# Patient Record
Sex: MALE | Race: BLACK OR AFRICAN AMERICAN | NOT HISPANIC OR LATINO | URBAN - METROPOLITAN AREA
[De-identification: names, ages, dates, MRNs, and addresses within clinical notes are randomized per-mention and may not be internally consistent; named-entity substitution may affect disease eponyms.]

---

## 2022-01-28 ENCOUNTER — EMERGENCY (EMERGENCY)
Facility: HOSPITAL | Age: 42
LOS: 1 days | Discharge: HOME | End: 2022-01-28
Attending: EMERGENCY MEDICINE | Admitting: EMERGENCY MEDICINE
Payer: MEDICAID

## 2022-01-28 VITALS
TEMPERATURE: 98 F | DIASTOLIC BLOOD PRESSURE: 95 MMHG | RESPIRATION RATE: 19 BRPM | SYSTOLIC BLOOD PRESSURE: 148 MMHG | OXYGEN SATURATION: 98 % | HEIGHT: 70 IN | WEIGHT: 160.06 LBS | HEART RATE: 106 BPM

## 2022-01-28 VITALS
SYSTOLIC BLOOD PRESSURE: 138 MMHG | HEART RATE: 88 BPM | OXYGEN SATURATION: 98 % | DIASTOLIC BLOOD PRESSURE: 87 MMHG | RESPIRATION RATE: 17 BRPM | TEMPERATURE: 98 F

## 2022-01-28 DIAGNOSIS — R42 DIZZINESS AND GIDDINESS: ICD-10-CM

## 2022-01-28 DIAGNOSIS — R20.2 PARESTHESIA OF SKIN: ICD-10-CM

## 2022-01-28 DIAGNOSIS — Z20.822 CONTACT WITH AND (SUSPECTED) EXPOSURE TO COVID-19: ICD-10-CM

## 2022-01-28 LAB
ALBUMIN SERPL ELPH-MCNC: 4.3 G/DL — SIGNIFICANT CHANGE UP (ref 3.3–5)
ALP SERPL-CCNC: 62 U/L — SIGNIFICANT CHANGE UP (ref 40–120)
ALT FLD-CCNC: 29 U/L — SIGNIFICANT CHANGE UP (ref 10–45)
ANION GAP SERPL CALC-SCNC: 10 MMOL/L — SIGNIFICANT CHANGE UP (ref 5–17)
AST SERPL-CCNC: 31 U/L — SIGNIFICANT CHANGE UP (ref 10–40)
BASOPHILS # BLD AUTO: 0.04 K/UL — SIGNIFICANT CHANGE UP (ref 0–0.2)
BASOPHILS NFR BLD AUTO: 0.6 % — SIGNIFICANT CHANGE UP (ref 0–2)
BILIRUB SERPL-MCNC: 0.3 MG/DL — SIGNIFICANT CHANGE UP (ref 0.2–1.2)
BUN SERPL-MCNC: 19 MG/DL — SIGNIFICANT CHANGE UP (ref 7–23)
CALCIUM SERPL-MCNC: 8.9 MG/DL — SIGNIFICANT CHANGE UP (ref 8.4–10.5)
CHLORIDE SERPL-SCNC: 98 MMOL/L — SIGNIFICANT CHANGE UP (ref 96–108)
CO2 SERPL-SCNC: 27 MMOL/L — SIGNIFICANT CHANGE UP (ref 22–31)
CREAT SERPL-MCNC: 1.06 MG/DL — SIGNIFICANT CHANGE UP (ref 0.5–1.3)
EOSINOPHIL # BLD AUTO: 0.11 K/UL — SIGNIFICANT CHANGE UP (ref 0–0.5)
EOSINOPHIL NFR BLD AUTO: 1.7 % — SIGNIFICANT CHANGE UP (ref 0–6)
GLUCOSE SERPL-MCNC: 78 MG/DL — SIGNIFICANT CHANGE UP (ref 70–99)
HCT VFR BLD CALC: 46.7 % — SIGNIFICANT CHANGE UP (ref 39–50)
HGB BLD-MCNC: 14.4 G/DL — SIGNIFICANT CHANGE UP (ref 13–17)
IMM GRANULOCYTES NFR BLD AUTO: 0.3 % — SIGNIFICANT CHANGE UP (ref 0–1.5)
LYMPHOCYTES # BLD AUTO: 3.98 K/UL — HIGH (ref 1–3.3)
LYMPHOCYTES # BLD AUTO: 62.7 % — HIGH (ref 13–44)
MAGNESIUM SERPL-MCNC: 2 MG/DL — SIGNIFICANT CHANGE UP (ref 1.6–2.6)
MCHC RBC-ENTMCNC: 28.2 PG — SIGNIFICANT CHANGE UP (ref 27–34)
MCHC RBC-ENTMCNC: 30.8 GM/DL — LOW (ref 32–36)
MCV RBC AUTO: 91.6 FL — SIGNIFICANT CHANGE UP (ref 80–100)
MONOCYTES # BLD AUTO: 0.6 K/UL — SIGNIFICANT CHANGE UP (ref 0–0.9)
MONOCYTES NFR BLD AUTO: 9.4 % — SIGNIFICANT CHANGE UP (ref 2–14)
NEUTROPHILS # BLD AUTO: 1.6 K/UL — LOW (ref 1.8–7.4)
NEUTROPHILS NFR BLD AUTO: 25.3 % — LOW (ref 43–77)
NRBC # BLD: 0 /100 WBCS — SIGNIFICANT CHANGE UP (ref 0–0)
PLATELET # BLD AUTO: 213 K/UL — SIGNIFICANT CHANGE UP (ref 150–400)
POTASSIUM SERPL-MCNC: 4 MMOL/L — SIGNIFICANT CHANGE UP (ref 3.5–5.3)
POTASSIUM SERPL-SCNC: 4 MMOL/L — SIGNIFICANT CHANGE UP (ref 3.5–5.3)
PROT SERPL-MCNC: 10.8 G/DL — HIGH (ref 6–8.3)
RBC # BLD: 5.1 M/UL — SIGNIFICANT CHANGE UP (ref 4.2–5.8)
RBC # FLD: 14.6 % — HIGH (ref 10.3–14.5)
SARS-COV-2 RNA SPEC QL NAA+PROBE: NEGATIVE — SIGNIFICANT CHANGE UP
SODIUM SERPL-SCNC: 135 MMOL/L — SIGNIFICANT CHANGE UP (ref 135–145)
TSH SERPL-MCNC: 1.98 UIU/ML — SIGNIFICANT CHANGE UP (ref 0.27–4.2)
WBC # BLD: 6.35 K/UL — SIGNIFICANT CHANGE UP (ref 3.8–10.5)
WBC # FLD AUTO: 6.35 K/UL — SIGNIFICANT CHANGE UP (ref 3.8–10.5)

## 2022-01-28 PROCEDURE — 84443 ASSAY THYROID STIM HORMONE: CPT

## 2022-01-28 PROCEDURE — 99284 EMERGENCY DEPT VISIT MOD MDM: CPT

## 2022-01-28 PROCEDURE — 93010 ELECTROCARDIOGRAM REPORT: CPT

## 2022-01-28 PROCEDURE — 87635 SARS-COV-2 COVID-19 AMP PRB: CPT

## 2022-01-28 PROCEDURE — 85025 COMPLETE CBC W/AUTO DIFF WBC: CPT

## 2022-01-28 PROCEDURE — 36415 COLL VENOUS BLD VENIPUNCTURE: CPT

## 2022-01-28 PROCEDURE — 93005 ELECTROCARDIOGRAM TRACING: CPT

## 2022-01-28 PROCEDURE — 83735 ASSAY OF MAGNESIUM: CPT

## 2022-01-28 PROCEDURE — 80053 COMPREHEN METABOLIC PANEL: CPT

## 2022-01-28 NOTE — ED PROVIDER NOTE - NSFOLLOWUPINSTRUCTIONS_ED_ALL_ED_FT
follow up with primary care provider/RDC after the weekend - call today to schedule.  Return to the Emergency Department if you have any new or worsening symptoms, or if you have any concerns.  ======================    Paresthesia    WHAT YOU NEED TO KNOW:    Paresthesia is numbness, tingling, or burning. It can happen in any part of your body, but usually occurs in your legs, feet, arms, or hands.    DISCHARGE INSTRUCTIONS:    Return to the emergency department if:   •You have severe pain along with numbness and tingling.      •Your legs suddenly become cold. You have trouble moving your legs, and they ache.      •You have increased weakness in a part of your body.      •You have uncontrolled movements.      Contact your healthcare provider or neurologist if:   •Your symptoms do not improve.      •You have symptoms in more than one part of your body.      •You have questions or concerns about your condition or care.      Manage paresthesia:   •Protect the area from injury. You may injure or burn yourself if you lose feeling in the area. Be careful when you touch anything that could be hot. Wear sturdy shoes to protect your feet. Ask about other ways to protect yourself.       •Go to physical or occupational therapy if directed. Your provider may recommend therapy if you have a condition such as carpal tunnel syndrome. A physical therapist can teach you exercises to help strengthen the area or increase your ability to move it. An occupational therapist can help you find new ways to do your daily activities.      •Manage health conditions that can cause paresthesia. Work with your diabetes specialist if you have uncontrolled diabetes. A dietitian or your healthcare provider can help you create a meal plan if you have low vitamin B levels. Your provider can help you manage your health if you have multiple sclerosis or you had a stroke. It is important to manage health conditions to stop paresthesia or prevent it from getting worse.      Follow up with your healthcare provider or neurologist as directed: Your healthcare provider may refer you to a specialist. Write down your questions so you remember to ask them during your visits.

## 2022-01-28 NOTE — ED PROVIDER NOTE - NSFOLLOWUPCLINICS_GEN_ALL_ED_FT
[facility]  HIV/AIDS Research & Treatment  [address] [place] [address]  Phone: [phone]  Fax:

## 2022-01-28 NOTE — ED PROVIDER NOTE - ATTENDING CONTRIBUTION TO CARE
41M hx HIV, c/o lightheadedness. pt states ongoing intermittently over past 2-3 weeks. states tonight had some tingling to hands and feet, with jaw clenching. no chest pain, no SOB. no n/v. pt states not currently taking his HIV meds.  no fevers.   gen- nad  heent- ncat, clear conj  cv -rrr  lungs -ctab  abd - soft, nt, nd  ext -wwp, no edema  neuro -aox3, steady gait, smith  no acute st/t wave changes on EKG, labs checked, no focal neuro deficits on exam. doubt acs.  recommend f/u in HIV clinic.

## 2022-01-28 NOTE — ED PROVIDER NOTE - NS ED ROS FT
CONSTITUTIONAL: No fever, chills, or weakness; no weight loss  NEURO: No headache, no dizziness, no syncope  EYES: No visual changes  ENT: No rhinorrhea or sore throat  PULM: No cough or dyspnea  CV: No chest pain or palpitations  GI: No abdominal pain, vomiting, or diarrhea  : No dysuria, hematuria, frequency  MSK: No neck pain or back pain, no joint pain  SKIN: no rash or unusual bruising

## 2022-01-28 NOTE — ED PROVIDER NOTE - CLINICAL SUMMARY MEDICAL DECISION MAKING FREE TEXT BOX
Brief episode paresthesia of hands and feet with tightening/clenching of jaw.  Asymptomatic in ED.  Suspicious for panic attack/hyperventilation syndrome.  Normal neuro exam. Normal EKG. Will check labs to eval for electrolyte abnormalities.

## 2022-01-28 NOTE — ED PROVIDER NOTE - PATIENT PORTAL LINK FT
You can access the FollowMyHealth Patient Portal offered by [facility] by registering at the following website: [email or web address]. By joining [employer]’s FollowMyHealth portal, you will also be able to view your health information using other applications (apps) compatible with our system.

## 2022-01-28 NOTE — ED PROVIDER NOTE - PHYSICAL EXAMINATION
CONSTITUTIONAL: NAD   SKIN: Normal color and turgor.    HEAD: NC/AT.  EYES: Conjunctiva clear. EOMI. PERRL.    ENT: Airway clear. Normal voice.  Goiter noted.  RESPIRATORY:  Normal work of breathing. Lungs CTAB.  CARDIOVASCULAR:  RRR, S1S2. No M/R/G.      GI:  Abdomen soft, nontender.    MSK: Neck supple.  No edema.  No muscular tenderness. No joint swelling or ROM limitation.  NEURO: Alert; CN: grossly intact. Speech clear.  MOSS. Ambulating well. CONSTITUTIONAL: WD/WN man in NAD; pleasant, calm and cooperative  SKIN: Normal color and turgor.    HEAD: NC/AT.  EYES: Conjunctiva clear. EOMI. PERRL.    ENT: Airway clear. Normal voice.  Goiter noted.  RESPIRATORY:  Normal work of breathing. Lungs CTAB.  CARDIOVASCULAR:  RRR, S1S2. No M/R/G.      GI:  Abdomen soft, nontender.    MSK: Neck supple.  No edema.  No muscular tenderness. No joint swelling or ROM limitation.  NEURO: Alert; CN: grossly intact. Speech clear.  MOSS. Ambulating well.

## 2022-01-28 NOTE — ED PROVIDER NOTE - INTERPRETATION
sinus arrhythmia/normal sinus rhythm, Normal axis, Normal SD interval and QRS complex. There are no acute ischemic ST or T-wave changes.

## 2022-01-28 NOTE — ED PROVIDER NOTE - OBJECTIVE STATEMENT
40 yo m PMHX HIV, for the past 2-3 weeks having episodes lightheadedness multiple times per day, lasting few seconds at a time.   Tonight while driving he says he experienced an episode of tingling in both hands and feet, along with clenching of his jaw.  The episode lasted about a minute, he was driving at the time and had to pull over.  No syncopal episodes, no CP or palpitations.  No recent illness, no fever/chills, no weight loss. He says he has not taken his HIV meds (Genvoya) since last summer because he has been distracted by the death of his mother last May.  He feels guilty about it because he had a bad feeling when he hugged her the day she .  He denies SI/HI and hallucinations/voices, has no psych hx. 42 yo m PMHX HIV, for the past 2-3 weeks having episodes lightheadedness multiple times per day, lasting few seconds at a time.   Tonight while driving he says he experienced an episode of tingling in both hands and feet, along with clenching of his jaw.  The episode lasted about a minute, he was driving at the time and had to pull over.  No syncopal episodes, no CP or palpitations.  No recent illness, no fever/chills, no weight loss. He says he has not taken his HIV meds (Genvoya) since last summer because he has been distracted by the death of his mother last May.  He feels guilty about it because he had a bad feeling when he hugged her the day she .  He denies SI/HI and hallucinations/voices, has no psych hx. Used crystal meth once in November, but denies any other illicit drug use.  No psych meds.  Only prescribed med Genvoya; he does not recall his CD4 or VL. 42 yo m PMHX HIV, for the past 2-3 weeks having episodes lightheadedness multiple times per day, lasting few seconds at a time.   Tonight while driving he says he experienced an episode of tingling in both hands and feet, along with clenching of his jaw.  The episode lasted about a minute, he was driving at the time and had to pull over.  No syncopal episodes, no CP or palpitations.  No recent illness, no fever/chills, no weight loss. He says he has not taken his HIV meds (Genvoya) since last summer because he has been distracted by the death of his mother last May.  He feels guilty about it because he had a bad feeling when he hugged her the day she .  He denies SI/HI and hallucinations/voices, has no psych hx. Used crystal meth once in November, but denies any other illicit drug use.  No psych meds.  Only prescribed med Genvoya; he does not recall his CD4 or VL.  Had Covid vaccine doses 1&2. 42 yo m PMHX HIV, for the past 2-3 weeks having episodes lightheadedness multiple times per day, lasting few seconds at a time.   Tonight while driving he says he experienced an episode of tingling in both hands and feet, along with clenching of his jaw.  The episode lasted about a minute, he was driving at the time and had to pull over.  He is asymptomatic in ED.  No hx of syncopal episodes, no CP or palpitations.  No recent illness, no fever/chills, no weight loss. He says he has not taken his HIV meds (Genvoya) since last summer because he has been distracted by the death of his mother last May.  He feels guilty about it because he had a bad feeling when he hugged her the day she .  He denies SI/HI and hallucinations/voices, has no psych hx. Used crystal meth once in November, but denies any other illicit drug use.  No psych meds.  Only prescribed med Genvoya; he does not recall his CD4 or VL.  Had Covid vaccine doses 1&2.

## 2022-01-28 NOTE — ED ADULT TRIAGE NOTE - ARRIVAL INFO ADDITIONAL COMMENTS
PMH of HIV - last CD4 count reported 7 months prior. Patient reports that he is supposed to be under medications - reports that he has been off for almost a year.

## 2022-01-28 NOTE — ED ADULT NURSE NOTE - CHIEF COMPLAINT QUOTE
"I have been having bouts of dizziness for about a week now. Before 6 last evening, I started feeling numbness and tingling to [BOTH] hands and [BOTH] feet and I felt that my jaw was clenching. I went to [facility] and I have been there since six - I did not want to wait any longer so I left and came here."

## 2022-01-28 NOTE — ED ADULT TRIAGE NOTE - CHIEF COMPLAINT QUOTE
"I have been having bouts of dizziness for about a week now. Before 6 last evening, I started feeling numbness and tingling to [BOTH] hands and [BOTH] feet and I felt that my jaw was clenching. I went to [facility] and I have been there since six - I did not want to wait any longer so I left and came here."

## 2022-08-08 ENCOUNTER — EMERGENCY (EMERGENCY)
Facility: HOSPITAL | Age: 42
LOS: 1 days | Discharge: HOME | End: 2022-08-08
Attending: STUDENT IN AN ORGANIZED HEALTH CARE EDUCATION/TRAINING PROGRAM | Admitting: STUDENT IN AN ORGANIZED HEALTH CARE EDUCATION/TRAINING PROGRAM
Payer: MEDICAID

## 2022-08-08 VITALS
DIASTOLIC BLOOD PRESSURE: 76 MMHG | SYSTOLIC BLOOD PRESSURE: 120 MMHG | OXYGEN SATURATION: 99 % | HEART RATE: 94 BPM | TEMPERATURE: 98 F | RESPIRATION RATE: 20 BRPM

## 2022-08-08 VITALS
TEMPERATURE: 102 F | RESPIRATION RATE: 18 BRPM | DIASTOLIC BLOOD PRESSURE: 76 MMHG | SYSTOLIC BLOOD PRESSURE: 128 MMHG | WEIGHT: 164.02 LBS | HEIGHT: 70 IN | HEART RATE: 121 BPM | OXYGEN SATURATION: 100 %

## 2022-08-08 DIAGNOSIS — Z20.822 CONTACT WITH AND (SUSPECTED) EXPOSURE TO COVID-19: ICD-10-CM

## 2022-08-08 DIAGNOSIS — N39.0 URINARY TRACT INFECTION, SITE NOT SPECIFIED: ICD-10-CM

## 2022-08-08 DIAGNOSIS — B20 HUMAN IMMUNODEFICIENCY VIRUS [HIV] DISEASE: ICD-10-CM

## 2022-08-08 DIAGNOSIS — N50.89 OTHER SPECIFIED DISORDERS OF THE MALE GENITAL ORGANS: ICD-10-CM

## 2022-08-08 DIAGNOSIS — D72.829 ELEVATED WHITE BLOOD CELL COUNT, UNSPECIFIED: ICD-10-CM

## 2022-08-08 DIAGNOSIS — N45.3 EPIDIDYMO-ORCHITIS: ICD-10-CM

## 2022-08-08 LAB
ALBUMIN SERPL ELPH-MCNC: 4 G/DL — SIGNIFICANT CHANGE UP (ref 3.3–5)
ALP SERPL-CCNC: 59 U/L — SIGNIFICANT CHANGE UP (ref 40–120)
ALT FLD-CCNC: 29 U/L — SIGNIFICANT CHANGE UP (ref 10–45)
ANION GAP SERPL CALC-SCNC: 9 MMOL/L — SIGNIFICANT CHANGE UP (ref 5–17)
APPEARANCE UR: CLEAR — SIGNIFICANT CHANGE UP
APTT BLD: 28.4 SEC — SIGNIFICANT CHANGE UP (ref 27.5–35.5)
AST SERPL-CCNC: 39 U/L — SIGNIFICANT CHANGE UP (ref 10–40)
BACTERIA # UR AUTO: PRESENT /HPF
BASOPHILS # BLD AUTO: 0.02 K/UL — SIGNIFICANT CHANGE UP (ref 0–0.2)
BASOPHILS NFR BLD AUTO: 0.1 % — SIGNIFICANT CHANGE UP (ref 0–2)
BILIRUB SERPL-MCNC: 0.7 MG/DL — SIGNIFICANT CHANGE UP (ref 0.2–1.2)
BILIRUB UR-MCNC: NEGATIVE — SIGNIFICANT CHANGE UP
BUN SERPL-MCNC: 14 MG/DL — SIGNIFICANT CHANGE UP (ref 7–23)
CALCIUM SERPL-MCNC: 9.3 MG/DL — SIGNIFICANT CHANGE UP (ref 8.4–10.5)
CHLORIDE SERPL-SCNC: 94 MMOL/L — LOW (ref 96–108)
CO2 SERPL-SCNC: 27 MMOL/L — SIGNIFICANT CHANGE UP (ref 22–31)
COLOR SPEC: YELLOW — SIGNIFICANT CHANGE UP
COMMENT - URINE: SIGNIFICANT CHANGE UP
CREAT SERPL-MCNC: 1.2 MG/DL — SIGNIFICANT CHANGE UP (ref 0.5–1.3)
DIFF PNL FLD: ABNORMAL
EGFR: 78 ML/MIN/1.73M2 — SIGNIFICANT CHANGE UP
EOSINOPHIL # BLD AUTO: 0 K/UL — SIGNIFICANT CHANGE UP (ref 0–0.5)
EOSINOPHIL NFR BLD AUTO: 0 % — SIGNIFICANT CHANGE UP (ref 0–6)
EPI CELLS # UR: SIGNIFICANT CHANGE UP /HPF (ref 0–5)
GLUCOSE SERPL-MCNC: 105 MG/DL — HIGH (ref 70–99)
GLUCOSE UR QL: NEGATIVE — SIGNIFICANT CHANGE UP
HCT VFR BLD CALC: 41.9 % — SIGNIFICANT CHANGE UP (ref 39–50)
HGB BLD-MCNC: 13.6 G/DL — SIGNIFICANT CHANGE UP (ref 13–17)
HYALINE CASTS # UR AUTO: SIGNIFICANT CHANGE UP /LPF (ref 0–2)
IMM GRANULOCYTES NFR BLD AUTO: 0.5 % — SIGNIFICANT CHANGE UP (ref 0–1.5)
INR BLD: 1.32 — HIGH (ref 0.88–1.16)
KETONES UR-MCNC: NEGATIVE — SIGNIFICANT CHANGE UP
LACTATE SERPL-SCNC: 1.4 MMOL/L — SIGNIFICANT CHANGE UP (ref 0.5–2)
LEUKOCYTE ESTERASE UR-ACNC: ABNORMAL
LYMPHOCYTES # BLD AUTO: 24.1 % — SIGNIFICANT CHANGE UP (ref 13–44)
LYMPHOCYTES # BLD AUTO: 3.6 K/UL — HIGH (ref 1–3.3)
MCHC RBC-ENTMCNC: 29.3 PG — SIGNIFICANT CHANGE UP (ref 27–34)
MCHC RBC-ENTMCNC: 32.5 GM/DL — SIGNIFICANT CHANGE UP (ref 32–36)
MCV RBC AUTO: 90.3 FL — SIGNIFICANT CHANGE UP (ref 80–100)
MONOCYTES # BLD AUTO: 1.34 K/UL — HIGH (ref 0–0.9)
MONOCYTES NFR BLD AUTO: 9 % — SIGNIFICANT CHANGE UP (ref 2–14)
NEUTROPHILS # BLD AUTO: 9.93 K/UL — HIGH (ref 1.8–7.4)
NEUTROPHILS NFR BLD AUTO: 66.3 % — SIGNIFICANT CHANGE UP (ref 43–77)
NITRITE UR-MCNC: NEGATIVE — SIGNIFICANT CHANGE UP
NRBC # BLD: 0 /100 WBCS — SIGNIFICANT CHANGE UP (ref 0–0)
PH UR: 6.5 — SIGNIFICANT CHANGE UP (ref 5–8)
PLATELET # BLD AUTO: 172 K/UL — SIGNIFICANT CHANGE UP (ref 150–400)
POTASSIUM SERPL-MCNC: 4.8 MMOL/L — SIGNIFICANT CHANGE UP (ref 3.5–5.3)
POTASSIUM SERPL-SCNC: 4.8 MMOL/L — SIGNIFICANT CHANGE UP (ref 3.5–5.3)
PROT SERPL-MCNC: 10.6 G/DL — HIGH (ref 6–8.3)
PROT UR-MCNC: ABNORMAL MG/DL
PROTHROM AB SERPL-ACNC: 15.8 SEC — HIGH (ref 10.5–13.4)
RBC # BLD: 4.64 M/UL — SIGNIFICANT CHANGE UP (ref 4.2–5.8)
RBC # FLD: 14.1 % — SIGNIFICANT CHANGE UP (ref 10.3–14.5)
RBC CASTS # UR COMP ASSIST: < 5 /HPF — SIGNIFICANT CHANGE UP
SARS-COV-2 RNA SPEC QL NAA+PROBE: NEGATIVE — SIGNIFICANT CHANGE UP
SODIUM SERPL-SCNC: 130 MMOL/L — LOW (ref 135–145)
SP GR SPEC: 1.01 — SIGNIFICANT CHANGE UP (ref 1–1.03)
UROBILINOGEN FLD QL: 1 E.U./DL — SIGNIFICANT CHANGE UP
WBC # BLD: 14.96 K/UL — HIGH (ref 3.8–10.5)
WBC # FLD AUTO: 14.96 K/UL — HIGH (ref 3.8–10.5)
WBC UR QL: ABNORMAL /HPF

## 2022-08-08 PROCEDURE — 96375 TX/PRO/DX INJ NEW DRUG ADDON: CPT

## 2022-08-08 PROCEDURE — 86593 SYPHILIS TEST NON-TREP QUANT: CPT

## 2022-08-08 PROCEDURE — 99285 EMERGENCY DEPT VISIT HI MDM: CPT | Mod: 25

## 2022-08-08 PROCEDURE — 87040 BLOOD CULTURE FOR BACTERIA: CPT

## 2022-08-08 PROCEDURE — 76870 US EXAM SCROTUM: CPT

## 2022-08-08 PROCEDURE — 71045 X-RAY EXAM CHEST 1 VIEW: CPT

## 2022-08-08 PROCEDURE — 85730 THROMBOPLASTIN TIME PARTIAL: CPT

## 2022-08-08 PROCEDURE — 93010 ELECTROCARDIOGRAM REPORT: CPT | Mod: 59

## 2022-08-08 PROCEDURE — 80053 COMPREHEN METABOLIC PANEL: CPT

## 2022-08-08 PROCEDURE — 71045 X-RAY EXAM CHEST 1 VIEW: CPT | Mod: 26

## 2022-08-08 PROCEDURE — 86592 SYPHILIS TEST NON-TREP QUAL: CPT

## 2022-08-08 PROCEDURE — 87186 SC STD MICRODIL/AGAR DIL: CPT

## 2022-08-08 PROCEDURE — 87635 SARS-COV-2 COVID-19 AMP PRB: CPT

## 2022-08-08 PROCEDURE — 86780 TREPONEMA PALLIDUM: CPT

## 2022-08-08 PROCEDURE — 36415 COLL VENOUS BLD VENIPUNCTURE: CPT

## 2022-08-08 PROCEDURE — 93005 ELECTROCARDIOGRAM TRACING: CPT

## 2022-08-08 PROCEDURE — 99284 EMERGENCY DEPT VISIT MOD MDM: CPT | Mod: 25

## 2022-08-08 PROCEDURE — 87086 URINE CULTURE/COLONY COUNT: CPT

## 2022-08-08 PROCEDURE — 83605 ASSAY OF LACTIC ACID: CPT

## 2022-08-08 PROCEDURE — 81001 URINALYSIS AUTO W/SCOPE: CPT

## 2022-08-08 PROCEDURE — 96374 THER/PROPH/DIAG INJ IV PUSH: CPT

## 2022-08-08 PROCEDURE — 76870 US EXAM SCROTUM: CPT | Mod: 26

## 2022-08-08 PROCEDURE — 85025 COMPLETE CBC W/AUTO DIFF WBC: CPT

## 2022-08-08 PROCEDURE — 85610 PROTHROMBIN TIME: CPT

## 2022-08-08 PROCEDURE — 87491 CHLMYD TRACH DNA AMP PROBE: CPT

## 2022-08-08 PROCEDURE — 87591 N.GONORRHOEAE DNA AMP PROB: CPT

## 2022-08-08 RX ORDER — MORPHINE SULFATE 50 MG/1
4 CAPSULE, EXTENDED RELEASE ORAL ONCE
Refills: 0 | Status: DISCONTINUED | OUTPATIENT
Start: 2022-08-08 | End: 2022-08-08

## 2022-08-08 RX ORDER — ACETAMINOPHEN 500 MG
975 TABLET ORAL ONCE
Refills: 0 | Status: COMPLETED | OUTPATIENT
Start: 2022-08-08 | End: 2022-08-08

## 2022-08-08 RX ORDER — KETOROLAC TROMETHAMINE 30 MG/ML
15 SYRINGE (ML) INJECTION ONCE
Refills: 0 | Status: COMPLETED | OUTPATIENT
Start: 2022-08-08 | End: 2022-08-08

## 2022-08-08 RX ORDER — CEFTRIAXONE 500 MG/1
1000 INJECTION, POWDER, FOR SOLUTION INTRAMUSCULAR; INTRAVENOUS ONCE
Refills: 0 | Status: COMPLETED | OUTPATIENT
Start: 2022-08-08 | End: 2022-08-08

## 2022-08-08 RX ORDER — SODIUM CHLORIDE 9 MG/ML
2300 INJECTION, SOLUTION INTRAVENOUS ONCE
Refills: 0 | Status: COMPLETED | OUTPATIENT
Start: 2022-08-08 | End: 2022-08-08

## 2022-08-08 RX ADMIN — Medication 100 MILLIGRAM(S): at 17:12

## 2022-08-08 RX ADMIN — Medication 975 MILLIGRAM(S): at 15:00

## 2022-08-08 RX ADMIN — CEFTRIAXONE 100 MILLIGRAM(S): 500 INJECTION, POWDER, FOR SOLUTION INTRAMUSCULAR; INTRAVENOUS at 17:11

## 2022-08-08 RX ADMIN — MORPHINE SULFATE 4 MILLIGRAM(S): 50 CAPSULE, EXTENDED RELEASE ORAL at 18:43

## 2022-08-08 RX ADMIN — Medication 975 MILLIGRAM(S): at 18:43

## 2022-08-08 RX ADMIN — SODIUM CHLORIDE 2300 MILLILITER(S): 9 INJECTION, SOLUTION INTRAVENOUS at 14:45

## 2022-08-08 RX ADMIN — MORPHINE SULFATE 4 MILLIGRAM(S): 50 CAPSULE, EXTENDED RELEASE ORAL at 17:12

## 2022-08-08 NOTE — CONSULT NOTE ADULT - ASSESSMENT
Patient is a  42M w/ epididymitis on Ultrasound  Patient is a  42M w/ epididymitis on Ultrasound, no torsion.  Patient is comfortable,  and improving.  At moment patient requires no acute  surgical intervention.  Patient educated to return to ed if development of worsening symptoms, intractable pain, w/ intractable nausea and vomiting, or if unable to urinate.  Patient understands and agrees.  He will follow up w/ outpatient Urology, and can be discharged to home w/ Levaquin 500mg daily for 10 days.    Recs:  -No acute  surgical intervention  -D/c w/ Levaquin 500mg daily for 10 days.  -f/u Urology clinic  -Return to ED if worsening symptoms, fevers, and nausea and vomiting.

## 2022-08-08 NOTE — ED ADULT NURSE NOTE - OBJECTIVE STATEMENT
43 YO M here for scrotal swelling and groin pain since Saturday.  VS in triage triggered sepsis, HR 120s and fever.  placed piv sent labs and administered meds per mar, pt placed on VS monitor, appears sick though in NAD, ambulated to and from bathroom. pt went for ultrasound

## 2022-08-08 NOTE — ED PROVIDER NOTE - PATIENT PORTAL LINK FT
You can access the FollowMyHealth Patient Portal offered by [facility] by registering at the following website: [email or web address]. By joining [employer]’s FollowMyHealth portal, you will also be able to view your health information using other applications (apps) compatible with our system.

## 2022-08-08 NOTE — ED PROVIDER NOTE - CLINICAL SUMMARY MEDICAL DECISION MAKING FREE TEXT BOX
Concern for orchitis/epididymitis vs other infectious pathology vs testicular torsion  Plan for urgent US scrotum  Labs to r/o severe infection/sepsis  GC/Chlamydia  Abx  IVF  Urology consult

## 2022-08-08 NOTE — ED ADULT NURSE NOTE - NSIMPLEMENTINTERV_GEN_ALL_ED
Implemented All Universal Safety Interventions:  [place] to call system. Call bell, personal items and telephone within reach. Instruct patient to call for assistance. Room bathroom lighting operational. Non-slip footwear when patient is off stretcher. Physically safe environment: no spills, clutter or unnecessary equipment. Stretcher in lowest position, wheels locked, appropriate side rails in place.

## 2022-08-08 NOTE — ED PROVIDER NOTE - OBJECTIVE STATEMENT
41 yo M w PMH HIV on HARRT therapy, p/w R testicular swelling and pain for 2 days. Pt states pain started upon standing from seated, has been constant since. Today he has fever and chills. No reported dysuria, frequency, urinary dc or skin lesions. Pt states he had similar complaint years ago that was w/u but no pathology found. He had sexual intercourse with a man 8 days ago and did not use a condom.

## 2022-08-08 NOTE — ED PROVIDER NOTE - PHYSICAL EXAMINATION
CONSTITUTIONAL: Non-toxic; in no apparent distress  HEAD: Normocephalic; atraumatic  EYES: PERRL; EOM intact   ENMT: External appears normal  NECK: Supple; non-tender  CARD: Normal S1, S2; no murmurs, rubs, or gallops  RESP: Normal chest excursion with respiration; breath sounds clear and equal bilaterally  ABD: Soft, non-distended; non-tender  : Enlarged tender R testicle, small L testicle, no erythema, no skin lesions appreciated, no inguinal hernia palpated, no penile discharge.  EXT: Normal ROM in all four extremities; non-tender to palpation  SKIN: Warm, dry, no rash  NEURO:  No focal neurological deficiencies.

## 2022-08-08 NOTE — ED PROVIDER NOTE - NSFOLLOWUPINSTRUCTIONS_ED_ALL_ED_FT
Follow up with your primary medical doctor as soon as possible.  Follow up with urology this week.  Return to the emergency department if your symptoms worsen or if you develop new symptoms.    Urinary Tract Infection    A urinary tract infection (UTI) is an infection of any part of the urinary tract, which includes the kidneys, ureters, bladder, and urethra. Risk factors include ignoring your need to urinate, wiping back to front if female, being an uncircumcised male, and having diabetes or a weak immune system. Symptoms include frequent urination, pain or burning with urination, foul smelling urine, cloudy urine, pain in the lower abdomen, blood in the urine, and fever. If you were prescribed an antibiotic medicine, take it as told by your health care provider. Do not stop taking the antibiotic even if you start to feel better.    SEEK IMMEDIATE MEDICAL CARE IF YOU HAVE ANY OF THE FOLLOWING SYMPTOMS: severe back or abdominal pain, fever, inability to keep fluids or medicine down, dizziness/lightheadedness, or a change in mental status.      Epididymitis       Epididymitis is swelling (inflammation) or infection of the epididymis. The epididymis is a cord-like structure that is located along the top and back part of the testicle. It collects and stores sperm from the testicle.    This condition can also cause pain and swelling of the testicle and scrotum. Symptoms usually start suddenly (acute epididymitis). Sometimes epididymitis starts gradually and lasts for a while (chronic epididymitis). This type may be harder to treat.      What are the causes?    In men ages 20–40, this condition is usually caused by a bacterial infection or a sexually transmitted disease (STD), such as:  •Gonorrhea.      •Chlamydia.      In men 40 and older who do not have anal sex, this condition is usually caused by bacteria from a blockage or from abnormalities in the urinary system. These can result from:  •Having a tube placed into the bladder (urinary catheter).      •Having an enlarged or inflamed prostate gland.      •Having recently had urinary tract surgery.      •Having a problem with a backward flow of urine (retrograde).      In men who have a condition that weakens the body's defense system (immune system), such as HIV, this condition can be caused by:  •Other bacteria, including tuberculosis and syphilis.      •Viruses.      •Fungi.      Sometimes this condition occurs without infection. This may happen because of trauma or repetitive activities such as sports.      What increases the risk?    You are more likely to develop this condition if you have:  •Unprotected sex with more than one partner.      •Anal sex.      •Recently had surgery.      •A urinary catheter.      •Urinary problems.      •A suppressed immune system.        What are the signs or symptoms?    This condition usually begins suddenly with chills, fever, and pain behind the scrotum and in the testicle. Other symptoms include:  •Swelling of the scrotum, testicle, or both.      •Pain when ejaculating or urinating.      •Pain in the back or abdomen.      •Nausea.      •Itching and discharge from the penis.      •A frequent need to pass urine.      •Redness, increased warmth, and tenderness of the scrotum.        How is this diagnosed?    Your health care provider can diagnose this condition based on your symptoms and medical history. Your health care provider will also do a physical exam to ask about your symptoms and check your scrotum and testicle for swelling, pain, and redness. You may also have other tests, including:  •Examination of discharge from the penis.      •Urine tests for infections, such as STDs.      •Ultrasound test for blood flow and inflammation.      Your health care provider may test you for other STDs, including HIV.      How is this treated?    Treatment for this condition depends on the cause. If your condition is caused by a bacterial infection, oral antibiotic medicine may be prescribed. If the bacterial infection has spread to your blood, you may need to receive IV antibiotics.    For both bacterial and nonbacterial epididymitis, you may be treated with:  •Rest.      •Elevation of the scrotum.      •Pain medicines.      •Anti-inflammatory medicines.      Surgery may be needed to treat:  •Bacterial epididymitis that causes pus to build up in the scrotum (abscess).      •Chronic epididymitis that has not responded to other treatments.        Follow these instructions at home:    Medicines     •Take over-the-counter and prescription medicines only as told by your health care provider.      •If you were prescribed an antibiotic medicine, take it as told by your health care provider. Do not stop taking the antibiotic even if your condition improves.      Sexual activity     •If your epididymitis was caused by an STD, avoid sexual activity until your treatment is complete.      •Inform your sexual partner or partners if you test positive for an STD. They may need to be treated. Do not engage in sexual activity with your partner or partners until their treatment is completed.        Managing pain and swelling    •If directed, elevate your scrotum and apply ice.  •Put ice in a plastic bag.      •Place a small towel or pillow between your legs.      •Rest your scrotum on the pillow or towel.      •Place another towel between your skin and the plastic bag.      •Leave the ice on for 20 minutes, 2–3 times a day.        •Try taking a sitz bath to help with discomfort. This is a warm water bath that is taken while you are sitting down. The water should only come up to your hips and should cover your buttocks. Do this 3–4 times per day or as told by your health care provider.      •Keep your scrotum elevated and supported while resting. Ask your health care provider if you should wear a scrotal support, such as a jockstrap. Wear it as told by your health care provider.      General instructions     •Return to your normal activities as told by your health care provider. Ask your health care provider what activities are safe for you.      •Drink enough fluid to keep your urine pale yellow.      •Keep all follow-up visits as told by your health care provider. This is important.        Contact a health care provider if:    •You have a fever.      •Your pain medicine is not helping.      •Your pain is getting worse.      •Your symptoms do not improve within 3 days.        Summary    •Epididymitis is swelling (inflammation) or infection of the epididymis. This condition can also cause pain and swelling of the testicle and scrotum.      •Treatment for this condition depends on the cause. If your condition is caused by a bacterial infection, oral antibiotic medicine may be prescribed.      •Inform your sexual partner or partners if you test positive for an STD. They may need to be treated. Do not engage in sexual activity with your partner or partners until their treatment is completed.      •Contact a health care provider if your symptoms do not improve within 3 days.      This information is not intended to replace advice given to you by your health care provider. Make sure you discuss any questions you have with your health care provider.

## 2022-08-08 NOTE — CONSULT NOTE ADULT - SUBJECTIVE AND OBJECTIVE BOX
Patient is a 42y old  Male who presents with a chief complaint of     HPI:    Patient is a 42M w/ PMH of HIV on HARRT, reporting to Ed w/ right scrotal edema which he first noted on Saturday.  Patient states this has happened to him in the past which he got Abx for which improved.  Patient endorses moderate right testicular pain, fully emptying bladder.        Vital Signs Last 24 Hrs  T(C): 38.8 (08 Aug 2022 15:40), Max: 39.1 (08 Aug 2022 14:26)  T(F): 101.8 (08 Aug 2022 15:40), Max: 102.3 (08 Aug 2022 14:26)  HR: 120 (08 Aug 2022 15:40) (120 - 121)  BP: 105/68 (08 Aug 2022 15:51) (105/68 - 128/76)  BP(mean): --  RR: 18 (08 Aug 2022 15:51) (18 - 18)  SpO2: 98% (08 Aug 2022 15:51) (98% - 100%)    Parameters below as of 08 Aug 2022 15:51  Patient On (Oxygen Delivery Method): room air      I&O's Summary      PE:  Gen:  Abd:  :  SILVANA:    LABS:                        13.6   14.96 )-----------( 172      ( 08 Aug 2022 15:20 )             41.9     08-08    130<L>  |  94<L>  |  14  ----------------------------<  105<H>  4.8   |  27  |  1.20    Ca    9.3      08 Aug 2022 15:20    TPro  10.6<H>  /  Alb  4.0  /  TBili  0.7  /  DBili  x   /  AST  39  /  ALT  29  /  AlkPhos  59  08-08    PT/INR - ( 08 Aug 2022 15:20 )   PT: 15.8 sec;   INR: 1.32          PTT - ( 08 Aug 2022 15:20 )  PTT:28.4 sec  Cultures      A/P Patient is a 42y old  Male who presents with a chief complaint of right testicular edema.     HPI:    Patient is a 42M w/ PMH of HIV on HARRT, reporting to Ed w/ right scrotal edema which he first noted on Saturday.  Patient states this has happened to him in the past which was worked up but did not find a cause, but swelling improved at the time.  Patient endorses moderate right testicular pain, fully emptying bladder.  He states worsening movement was when he sat up from sitting position, caused him pain and noted that his right testicle was started to swell up. Patient had unprotected sex approx 8 days ago MSM.  Patient denies n/v, dysuria, hematuria, LUT's, penile edema, abdominal pain, chest pain, SOB.       Vital Signs Last 24 Hrs  T(C): 38.8 (08 Aug 2022 15:40), Max: 39.1 (08 Aug 2022 14:26)  T(F): 101.8 (08 Aug 2022 15:40), Max: 102.3 (08 Aug 2022 14:26)  HR: 120 (08 Aug 2022 15:40) (120 - 121)  BP: 105/68 (08 Aug 2022 15:51) (105/68 - 128/76)  BP(mean): --  RR: 18 (08 Aug 2022 15:51) (18 - 18)  SpO2: 98% (08 Aug 2022 15:51) (98% - 100%)    Parameters below as of 08 Aug 2022 15:51  Patient On (Oxygen Delivery Method): room air      I&O's Summary      PE:  Gen: NAD, alert and oriented x 3   Abd: soft nt/nd. Negative CVAT B/L  : Positive edema noted to right testicle, feels firm, and mildly tender.  Penis non edematous non tender to palpation, no noted blood to meatus, pr malodorous output.   SILVANA: Deferred.     LABS:                        13.6   14.96 )-----------( 172      ( 08 Aug 2022 15:20 )             41.9     08-08    130<L>  |  94<L>  |  14  ----------------------------<  105<H>  4.8   |  27  |  1.20    Ca    9.3      08 Aug 2022 15:20    TPro  10.6<H>  /  Alb  4.0  /  TBili  0.7  /  DBili  x   /  AST  39  /  ALT  29  /  AlkPhos  59  08-08    PT/INR - ( 08 Aug 2022 15:20 )   PT: 15.8 sec;   INR: 1.32          PTT - ( 08 Aug 2022 15:20 )  PTT:28.4 sec  Cultures      A/P

## 2022-08-08 NOTE — ED PROVIDER NOTE - PROGRESS NOTE DETAILS
Pt seen by urology who recs dc w levoquin. HR improvement with tylenol for fever and pain. Labs show elev WBC but normal LA  No evidence of torsion on US  Pt is ready for discharge and safe discharge has been established. Pt was treated for orchitis and showed improvement. Will d/c with outpatient follow-up w uro and PMD.  Strict return-precautions were given and understanding was verbalized by patient.

## 2022-08-09 LAB
C TRACH RRNA SPEC QL NAA+PROBE: SIGNIFICANT CHANGE UP
N GONORRHOEA RRNA SPEC QL NAA+PROBE: SIGNIFICANT CHANGE UP
SPECIMEN SOURCE: SIGNIFICANT CHANGE UP

## 2022-08-10 LAB
-  AMPICILLIN/SULBACTAM: SIGNIFICANT CHANGE UP
-  AMPICILLIN: SIGNIFICANT CHANGE UP
-  CEFAZOLIN: SIGNIFICANT CHANGE UP
-  CEFTRIAXONE: SIGNIFICANT CHANGE UP
-  CIPROFLOXACIN: SIGNIFICANT CHANGE UP
-  ERTAPENEM: SIGNIFICANT CHANGE UP
-  GENTAMICIN: SIGNIFICANT CHANGE UP
-  NITROFURANTOIN: SIGNIFICANT CHANGE UP
-  PIPERACILLIN/TAZOBACTAM: SIGNIFICANT CHANGE UP
-  TOBRAMYCIN: SIGNIFICANT CHANGE UP
-  TRIMETHOPRIM/SULFAMETHOXAZOLE: SIGNIFICANT CHANGE UP
CULTURE RESULTS: SIGNIFICANT CHANGE UP
METHOD TYPE: SIGNIFICANT CHANGE UP
ORGANISM # SPEC MICROSCOPIC CNT: SIGNIFICANT CHANGE UP
ORGANISM # SPEC MICROSCOPIC CNT: SIGNIFICANT CHANGE UP
RPR SER-TITR: (no result)
RPR SERPL-ACNC: REACTIVE
SPECIMEN SOURCE: SIGNIFICANT CHANGE UP
T PALLIDUM AB TITR SER: POSITIVE

## 2022-08-10 NOTE — ED POST DISCHARGE NOTE - DETAILS
mailbox full pt called, unable to leave a message, remains for further f/u attempts pt called, unable to leave a message, given 3rd failed attempt to contact, will have keshia thomas send a letter

## 2022-08-13 LAB
CULTURE RESULTS: SIGNIFICANT CHANGE UP
CULTURE RESULTS: SIGNIFICANT CHANGE UP
SPECIMEN SOURCE: SIGNIFICANT CHANGE UP
SPECIMEN SOURCE: SIGNIFICANT CHANGE UP

## 2024-08-06 NOTE — ED PROVIDER NOTE - CARE PROVIDER_API CALL
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 Archer is a very pleasant 58-year-old female with past medical history of hypertension hyperlipidemia, COPD, osteoarthritis, anxiety who presents for new oncology evaluation with her daughter for diagnosis of small cell lung cancer.  Patient previously had PET scan in September 2023 without any concerning findings.  Follow-up CT chest in February showed multiple right middle lobe nodules with mediastinal and right hilar adenopathy.  Patient was admitted to [facility] on June 2 with shortness of breath, fever, cough.  She was started on treatment for pneumonia.  She was found to be hyponatremic to 126.  Repeat CT chest on 3 Tia showed progression of right middle lobe nodules and mediastinal right hilar lymphadenopathy.  Patient was discharged on 4 June.  She had outpatient bronchoscopy on 7 June with station 4R and station 7 possible small cell carcinoma.  Unable to get MRI due to claustrophobia.  CT head with and without contrast on 13 June did not show any intracranial mets.  PET scan confirmed metastatic disease to bone and right axilla    Interval History  Here for follow up.  Patient is on chemotherapy with carboplatin, etoposide, and durvalumab.  She is due for cycle 3. Unfortunately after cycle 2 patient had significant nausea associated with vomiting. She presnted to the ED and was admitted and placed on fluids. She was noted to be pancytopenic as well and was given blood transfusion. She was started on neupogen.She was discharged after clinical improvement. She reports she is feeling better now. She is agreeable to chemotherapy today. Plan for dose reduction.    Oncology/Hematology History Overview Note   2/20/24: CT Chest:  No PE or  aortic dissection. Multiple right middle lobe nodules are highly suspicious for malignancy.  Additionally, there is mediastinal and right hilar adenopathy now noted.  Follow-up with a PET-CT is recommended. Emphysema with chronic changes in both lungs that otherwise appears stable. Atherosclerotic disease and coronary artery disease.    6/2/24: admitted to [facility] with shortness of breath, fever, cough and started on treatment for pneumonia. Found to have hyponatremia to 126    6/3/24 CT Chest: Right middle lobe nodules and mediastinal and right hilar lymphadenopathy has progressed from prior CT, and remains concerning for malignancy.  Partial right middle lobe atelectasis. Moderate emphysema. Discharged on 6/4/24 with Na of 133.    6/7/24: Bronchoscopy: Station 4R and station 7 positive for small cell carcinoma.    6/13/24: CT head with and without contrast (due to claustrophobia): No mets seen    6/19/24: NM PET: New hypermetabolic nodules within the right middle lobe. There are also multiple new enlarged hypermetabolic mediastinal lymph nodes consistent with metastatic disease. Right axillary mets as well. There are scattered foci of hypermetabolism throughout the bony structures consistent with bone metastases.    6/24/24: C1D1 Carbo/Etop/Durva. Tolerated well    7/16/24: C2D1 Carbo/Etop/Durva. Complicated by inpatient admission due to dehydration from nausea/vomiting as well as pancytopenia. ANC 0.11. Required transfusion for hgb 6.8.     8/6/24: C3D1 Carbo/Etop/Durva. 20% reduction in Carbo and 20% reduction in Etoposide. Add G-CSF with this cycle due to severe neutropenia with C2.         Small cell lung cancer   6/12/2024 Initial Diagnosis    Small cell lung cancer     6/14/2024 - 6/14/2024 Chemotherapy    OP LUNG CISplatin 60mg/m2 / Etoposide 120mg/m2 + XRT     6/14/2024 Cancer Staged    Staging form: Lung, AJCC 8th Edition  - Clinical: Stage IVB (cT1b, cN2, cM1c) - Signed by Brian Dickson MD on  6/20/2024 6/24/2024 -  Chemotherapy    OP LUNG CARBOplatin AUC=5 / Etoposide / Durvalumab     8/6/2024 -  Chemotherapy    OP SUPPORTIVE Denosumab (Xgeva) Q28D     Cancer, metastatic to bone   8/6/2024 -  Chemotherapy    OP SUPPORTIVE Denosumab (Xgeva) Q28D     8/6/2024 Initial Diagnosis    Cancer, metastatic to bone           Review of Systems   Constitutional:  Positive for fatigue. Negative for appetite change, diaphoresis, fever, unexpected weight gain and unexpected weight loss.   HENT:  Negative for hearing loss, sore throat and voice change.    Eyes:  Negative for blurred vision, double vision, pain, redness and visual disturbance.   Respiratory:  Negative for cough, shortness of breath and wheezing.    Cardiovascular:  Positive for chest pain (pt had her port put in today). Negative for palpitations and leg swelling.   Gastrointestinal:  Positive for nausea.   Endocrine: Negative for cold intolerance, heat intolerance, polydipsia and polyuria.   Genitourinary:  Negative for decreased urine volume, difficulty urinating, frequency and urinary incontinence.   Musculoskeletal:  Negative for arthralgias, back pain, joint swelling and myalgias.   Skin:  Negative for color change, rash, skin lesions and wound.   Neurological:  Negative for dizziness, seizures, numbness and headache.   Hematological:  Negative for adenopathy. Does not bruise/bleed easily.   Psychiatric/Behavioral:  Negative for depressed mood. The patient is not nervous/anxious.    All other systems reviewed and are negative.    Current Outpatient Medications on File Prior to Visit   Medication Sig Dispense Refill    albuterol sulfate  (90 Base) MCG/ACT inhaler Inhale 2 puffs Every 4 (Four) Hours As Needed for Wheezing or Shortness of Air. 18 g 5    atorvastatin (LIPITOR) 10 MG tablet Take 1 tablet by mouth Every Night. 90 tablet 3    buPROPion SR (WELLBUTRIN SR) 150 MG 12 hr tablet Take 1 tablet by mouth 2 (Two) Times a Day. 180 tablet 3     Cariprazine HCl (Vraylar) 1.5 MG capsule capsule Take 1 capsule by mouth Daily. 90 capsule 1    escitalopram (LEXAPRO) 20 MG tablet Take 1 tablet by mouth Daily. 90 tablet 3    gabapentin (NEURONTIN) 300 MG capsule Take 1 capsule by mouth 3 (Three) Times a Day. 90 capsule 0    HYDROcodone-acetaminophen (NORCO) 5-325 MG per tablet Take 1 tablet by mouth Every 6 (Six) Hours As Needed (Pain). 12 tablet 0    ipratropium-albuterol (DUO-NEB) 0.5-2.5 mg/3 ml nebulizer Take 3 mL by nebulization 4 (Four) Times a Day for 30 days. 360 mL 5    LORazepam (ATIVAN) 0.5 MG tablet Take 1 tablet by mouth Every 8 (Eight) Hours As Needed for Anxiety. 30 tablet 0    nicotine (NICODERM CQ) 21 MG/24HR patch Place 1 patch on the skin as directed by provider Daily. 30 patch 0    nystatin (MYCOSTATIN) 100,000 unit/mL suspension Swish and swallow 5 mL 4 (Four) Times a Day As Needed (for thrush, if needing to take use for 7-10days until symptoms resolve). 473 mL 0    omeprazole (priLOSEC) 40 MG capsule Take 1 capsule by mouth Daily.      ondansetron (ZOFRAN) 8 MG tablet Take 1 tablet by mouth 3 (Three) Times a Day As Needed for Nausea or Vomiting. 30 tablet 5    ondansetron ODT (ZOFRAN-ODT) 4 MG disintegrating tablet Place 2 tablets on the tongue Every 8 (Eight) Hours As Needed for Nausea or Vomiting. 30 tablet 0    prochlorperazine (COMPAZINE) 10 MG tablet Take 1 tablet by mouth Every 6 (Six) Hours As Needed for Nausea. 60 tablet 3    traMADol (ULTRAM) 50 MG tablet Take 1 tablet by mouth Every 6 (Six) Hours As Needed for Moderate Pain. 90 tablet 0    Trelegy Ellipta 100-62.5-25 MCG/ACT inhaler Inhale 1 puff Daily. 3 each 3     Current Facility-Administered Medications on File Prior to Visit   Medication Dose Route Frequency Provider Last Rate Last Admin    [COMPLETED] HYDROmorphone (DILAUDID) injection 1 mg  1 mg Intravenous Once Kamari Tsai MD   0.5 mg at 08/05/24 1520    [COMPLETED] ipratropium-albuterol (DUO-NEB) nebulizer solution  3 mL  3 mL Nebulization Once Kamari Tsai MD   3 mL at 24 1526    [COMPLETED] ondansetron (ZOFRAN) injection 4 mg  4 mg Intravenous Once Kamari Tsai MD   4 mg at 24 1519    [DISCONTINUED] sodium chloride 0.9 % flush 10 mL  10 mL Intravenous PRN Kamari Tsai MD           No Known Allergies  Past Medical History:   Diagnosis Date    Abnormal mammogram     PT DENIES KNOWING OF THIS HX    Anxiety     Arthritis     R SHOULDER, R HIP, AND R LEG    Cancer     LUNG    Chronic nausea 01/15/2019    COPD (chronic obstructive pulmonary disease)     O2 3 LITERS NC CONT    Hernia, hiatal     Hyperlipidemia     Hypertension     Knee pain, right 2018    Memory loss     FORGETFULNESS ? ETIOLOGY    Migraine headache     Moderate episode of recurrent major depressive disorder 2017    Night sweats     Sciatica of right side 2017    Severe episode of recurrent major depressive disorder, without psychotic features 10/22/2019    Shingles     OUTBREAK 24 ON ANTIVIRAL , WHELPS NOTED NO SORES.    Shoulder pain, left 2018     Past Surgical History:   Procedure Laterality Date    BRONCHOSCOPY N/A 2022    Procedure: BRONCHOSCOPY WITH BRONCHOALVEOLAR LAVAGE, BIOPSY;  Surgeon: Shin Mcdonald DO;  Location: [facility] ENDOSCOPY;  Service: Pulmonary;  Laterality: N/A;  RIGHT LOWER LOBE INFILTRATE    BRONCHOSCOPY N/A 2024    Procedure: BRONCHOSCOPY WITH ENDOBRONCHIAL ULTRASOUND AND FINE NEEDLE ASPIRATE;  Surgeon: Shin Mcdonald DO;  Location: [facility] ENDOSCOPY;  Service: Pulmonary;  Laterality: N/A;  MEDIASTINAL ADENOPATHY     SECTION      CHOLECYSTECTOMY      LAPAROSCOPIC    COLONOSCOPY      PORTACATH PLACEMENT Left 2024    Procedure: INSERTION OF PORTACATH;  Surgeon: Josh Patton MD;  Location: [facility] OR [facility];  Service: General;  Laterality: Left;    TOTAL HIP ARTHROPLASTY Right 2022    Procedure: RIGHT TOTAL HIP ARTHROPLASTY;  Surgeon: Eliseo  Cecil BRADLEY MD;  Location: [facility] MAIN OR;  Service: Orthopedics;  Laterality: Right;     Social History     Socioeconomic History    Marital status:      Spouse name: DEVAN    Number of children: 2    Years of education: GED    Highest education level: GED or equivalent   Tobacco Use    Smoking status: Every Day     Current packs/day: 2.00     Average packs/day: 2.0 packs/day for 42.6 years (85.2 ttl pk-yrs)     Types: Cigarettes     Start date: 1982     Passive exposure: Past    Smokeless tobacco: Never    Tobacco comments:     Pt stopped smoking on 04/01/2022. Pt stated at her appt today 06/12/2024. Pt states she smokes less than a 1/2 ppd      INST. PER ANESTHESIA PROTOCOL   Vaping Use    Vaping status: Former    Substances: Nicotine, Flavoring    Devices: Disposable   Substance and Sexual Activity    Alcohol use: Never    Drug use: Never    Sexual activity: Defer     Family History   Problem Relation Age of Onset    Other Mother         BLOOD DISEASE    Arthritis Mother     Heart disease Father     Hypertension Other     Cancer Other     Heart disease Other     Malig Hyperthermia Neg Hx        Objective   Physical Exam  Well appearing patient in no acute distress on RA  Anicteric sclerae, + healing shingles rash on abdomen with radiation to back on right.   + poor dentition.   Respirations non-labored  Awake, alert, and oriented x 4. Speech intact. No gross neurologic deficit  Appropriate mood and affect    Vitals:    08/06/24 0903   BP: 106/61   Pulse: 114   Resp: 22   Temp: 98.2 °F (36.8 °C)   TempSrc: Temporal                 PHQ-9 Total Score:                Result Review :   The following data was reviewed by: Brian Dickson MD on 08/06/24:  Lab Results   Component Value Date    HGB 8.6 (L) 08/06/2024    HCT 27.2 (L) 08/06/2024    MCV 92.8 08/06/2024     (L) 08/06/2024    WBC 14.39 (H) 08/06/2024    NEUTROABS 8.75 (H) 08/06/2024    LYMPHSABS 2.26 08/06/2024    MONOSABS 2.09 (H)  08/06/2024    EOSABS 0.01 08/06/2024    BASOSABS 0.01 08/06/2024     Lab Results   Component Value Date    GLUCOSE 113 (H) 08/06/2024    BUN 5 (L) 08/06/2024    CREATININE 0.84 08/06/2024     08/06/2024    K 3.3 (L) 08/06/2024     08/06/2024    CO2 30.0 (H) 08/06/2024    CALCIUM 8.4 (L) 08/06/2024    PROTEINTOT 5.9 (L) 08/06/2024    ALBUMIN 3.5 08/06/2024    BILITOT 0.4 08/06/2024    ALKPHOS 154 (H) 08/06/2024    AST 20 08/06/2024    ALT 21 08/06/2024     Lab Results   Component Value Date    MG 2.0 06/04/2024    PHOS 2.9 06/04/2024    FREET4 1.57 06/24/2024    TSH 1.330 06/24/2024     Labs personally reviewed. Sodium normal. Hgb low. WBC high. Plts low but above 100.     Discharge summary personally reviewed        XR Chest 1 View    Result Date: 8/5/2024  Impression: Mild left basilar opacity, which could reflect atelectasis or pneumonia. Electronically Signed: Isma Roche MD  8/5/2024 3:32 PM EDT  Workstation ID: [id number]    XR Chest 1 View    Result Date: 7/31/2024  Impression: 1. Patchy nodular densities in the lung bases appear unchanged. 2. No new airspace disease. Electronically Signed: Cecy De Jesus MD  7/31/2024 4:32 PM EDT  Workstation ID: [id number]         Assessment and Plan    Diagnoses and all orders for this visit:    1. Small cell lung cancer (Primary)  -     CBC and Differential; Future  -     Comprehensive metabolic panel; Future  -     Magnesium; Future  -     Phosphorus; Future  -     Lab Appointment Request; Future  -     Clinic Appointment Request; Future  -     Infusion Appointment Request 3; Future  -     ONCBCN INFUSION APPOINTMENT REQUEST 01; Future  -     ONCBCN INFUSION APPOINTMENT REQUEST 01; Future    2. Cancer, metastatic to bone  -     CBC and Differential; Future  -     Comprehensive metabolic panel; Future  -     Magnesium; Future  -     Phosphorus; Future  -     Lab Appointment Request; Future  -     Clinic Appointment Request; Future  -     Infusion Appointment  Request 3; Future    3. Chemotherapy-induced nausea    4. Anxiety    5. Chemotherapy induced neutropenia    Other orders  -     prochlorperazine (COMPAZINE) 10 MG tablet; Take 1 tablet by mouth Every 6 (Six) Hours As Needed for Nausea.  Dispense: 60 tablet; Refill: 3  -     OLANZapine (zyPREXA) 5 MG tablet; Take 1 tablet by mouth Take As Directed. Take 1 tablet nightly x 7 nights starting first day of chemotherapy. Repeat every cycle  Dispense: 14 tablet; Refill: 0  -     denosumab (XGEVA) injection 120 mg  -     sodium chloride 0.9 % infusion  -     durvalumab (IMFINZI) 1,500 mg in sodium chloride 0.9 % 280 mL chemo IVPB  -     palonosetron (ALOXI) injection 0.25 mg  -     fosaprepitant (EMEND) 150 mg in sodium chloride 0.9 % 100 mL IVPB  -     dexAMETHasone (DECADRON) 12 mg in sodium chloride 0.9 % IVPB  -     CARBOplatin (PARAPLATIN) 600 mg in sodium chloride 0.9 % 310 mL chemo IVPB  -     etoposide (TOPOSAR) 160 mg in sodium chloride 0.9 % 508 mL chemo IVPB  -     sodium chloride 0.9 % infusion  -     dexAMETHasone (DECADRON) 12 mg in sodium chloride 0.9 % IVPB  -     etoposide (TOPOSAR) 160 mg in sodium chloride 0.9 % 508 mL chemo IVPB  -     sodium chloride 0.9 % infusion  -     dexAMETHasone (DECADRON) 12 mg in sodium chloride 0.9 % IVPB  -     etoposide (TOPOSAR) 160 mg in sodium chloride 0.9 % 508 mL chemo IVPB          Small cell lung cancer, extensive stage  RML with right hilar and mediastinal adenopathy on CT chest.  Bronchoscopy with positive small cell pathology at station 4R and station 7.  PET with multiple new enlarged hypermetabolic mediastinal lymph nodes consistent with metastatic disease, also with new right axillary FDG avid mets. There are scattered foci of hypermetabolism throughout the bony structures consistent with bone metastases. Discussed results. Discussed that this represents metastatic or stage IV cancer.  CT head with and without without any intracranial mets.  Unable to do MRI due  to claustrophobia and anxiety. Recommended treatment is systemic alone with for IV carboplatin, etoposide and durvalumab every 3 weeks. First cycle 6/24/24.     8/6/24: C3D1 Carbo/Etop/Durva. 20% reduction in Carbo and 20% reduction in Etoposide. Add G-CSF with this cycle due to severe neutropenia with C2. Labs appropriate to proceed. Ok to treat for elevated alk phos.     RTC 3 weeks for C4. Repeat scans after C4.      Likely to hold on consolidative radiation as no dominant lung lesion.     Chemo nausea  Extend olanzapine to 7 nights starting with C3. Alternate compazine and zofran. Plan for weekly labs and IV fluids starting with C3.     Mucositis  Recommend salt/soda rinse    Met cancer to bone  Recommend bone modifying agent. Discussed at this visit. Patient has all teeth except 2 removed. Plan to start monthly Xgeva. Will monitor electrolytes.     Anxiety  Present at baseline now worse. PRN ativan 0.5 mg.     Shingles  Treated with valacyclovir. Currently healing. Still has some pain. Gabapentin previously added.    Insomnia  Recommend low dose melatonin vs benadryl. Also has Ativan if anxeity is contributing to poor sleep.      I spent 30 minutes caring for Lluvia on this date of service. This time includes time spent by me in the following activities:preparing for the visit, reviewing tests, obtaining and/or reviewing a separately obtained history, performing a medically appropriate examination and/or evaluation , counseling and educating the patient/family/caregiver, ordering medications, tests, or procedures, referring and communicating with other health care professionals , documenting information in the medical record, independently interpreting results and communicating that information with the patient/family/caregiver, and care coordination    Patient Follow Up: Cycle 4  Patient was given instructions and counseling regarding her condition or for health maintenance advice. Please see specific  information pulled into the AVS if appropriate.                  Jennifer Blevins)  Internal Medicine  [address] [address]  Phone: [phone]  Fax: [phone]  Follow Up Time:

## 2025-01-30 ENCOUNTER — EMERGENCY (EMERGENCY)
Facility: HOSPITAL | Age: 45
LOS: 1 days | Discharge: HOME | End: 2025-01-30
Attending: EMERGENCY MEDICINE | Admitting: EMERGENCY MEDICINE
Payer: SELF-PAY

## 2025-01-30 VITALS
SYSTOLIC BLOOD PRESSURE: 144 MMHG | RESPIRATION RATE: 16 BRPM | HEART RATE: 100 BPM | OXYGEN SATURATION: 100 % | DIASTOLIC BLOOD PRESSURE: 98 MMHG

## 2025-01-30 VITALS
SYSTOLIC BLOOD PRESSURE: 138 MMHG | RESPIRATION RATE: 17 BRPM | OXYGEN SATURATION: 99 % | HEART RATE: 108 BPM | TEMPERATURE: 98 F | DIASTOLIC BLOOD PRESSURE: 101 MMHG | WEIGHT: 160.94 LBS | HEIGHT: 70 IN

## 2025-01-30 DIAGNOSIS — R09.81 NASAL CONGESTION: ICD-10-CM

## 2025-01-30 DIAGNOSIS — I10 ESSENTIAL (PRIMARY) HYPERTENSION: ICD-10-CM

## 2025-01-30 DIAGNOSIS — R04.0 EPISTAXIS: ICD-10-CM

## 2025-01-30 DIAGNOSIS — R05.1 ACUTE COUGH: ICD-10-CM

## 2025-01-30 DIAGNOSIS — J34.89 OTHER SPECIFIED DISORDERS OF NOSE AND NASAL SINUSES: ICD-10-CM

## 2025-01-30 DIAGNOSIS — Z21 ASYMPTOMATIC HUMAN IMMUNODEFICIENCY VIRUS [HIV] INFECTION STATUS: ICD-10-CM

## 2025-01-30 PROCEDURE — 99283 EMERGENCY DEPT VISIT LOW MDM: CPT

## 2025-01-30 PROCEDURE — 99282 EMERGENCY DEPT VISIT SF MDM: CPT

## 2025-01-30 NOTE — ED ADULT TRIAGE NOTE - CHIEF COMPLAINT QUOTE
Pt presents to ED C/O one episode of epistaxis today on the way to work. Pt states, " I put my nose back and held my nose and it eventually stopped". Pt denies blood thinners. Reports recently upper respiratory symptoms. States, " My blood pressure has been a little high lately I'm not diagnosed with anything". Pt bleeding stopped PTA.

## 2025-01-30 NOTE — ED ADULT NURSE NOTE - OBJECTIVE STATEMENT
44 year old M patient, presents to ED w c/o of nosebleed that subsided PTA.  States it was his L nostril lasting approx 5 mins.  Noted to be slightly HTN in triagem denies hx of HTN.  Denies dizziness, headache.

## 2025-01-30 NOTE — ED PROVIDER NOTE - PHYSICAL EXAMINATION
scant dried blood medial aspect of L nostril. no hematoma.   No tonsillar hypertrophy, exudates, erythema. No obvious LAD. No trismus. No stridor/drooling, neck FROM. Normal sounding voice. Uvula midline. No facial swelling. scant dried blood medial aspect of L nostril. no hematoma.   No tonsillar hypertrophy, exudates, erythema. No obvious LAD. No trismus. No stridor/drooling, neck FROM. Normal sounding voice. Uvula midline. No facial swelling.  normal conjunctiva

## 2025-01-30 NOTE — ED ADULT NURSE NOTE - NS ED NURSE RECORD ANOTHER VITAL SIGN
[de-identified] : Here for one week followup after excision and drainage of an abscess. Patient reports no problems with taking care of the wound over last week. There has been no drainage since last week.
Yes, record another set of vital signs

## 2025-01-30 NOTE — ED ADULT TRIAGE NOTE - GLASGOW COMA SCALE: SCORE, MLM
"Chief Complaint   Patient presents with     Conjunctivitis     x 5 days     Panel Management       Initial /72 mmHg  Pulse 64  Temp(Src) 97.3  F (36.3  C) (Temporal)  Resp 16  Ht 5' 7.4\" (1.712 m)  Wt 219 lb 11.2 oz (99.655 kg)  BMI 34.00 kg/m2 Estimated body mass index is 34 kg/(m^2) as calculated from the following:    Height as of this encounter: 5' 7.4\" (1.712 m).    Weight as of this encounter: 219 lb 11.2 oz (99.655 kg).  BP completed using cuff size: lloyd Newman CMA (AAMA)    " 15

## 2025-01-30 NOTE — ED PROVIDER NOTE - CLINICAL SUMMARY MEDICAL DECISION MAKING FREE TEXT BOX
44M PMH HIV (unknown last CD4 or VL, non-adherent to HAART) p/w epistaxis. Has 1-2w of mild cough/rhinorrhea/congestion. Today he leaned over while brushing his teeth and developed sudden onset L nose bleed. Held pressure, lasted a few minutes, now resolved. No other systemic symptoms.   Mild HTN. Mild triage tachycardia self improving, other vitals wnl. Exam as above.   ddx: Likely mild URI. Clinically benign epistaxis, now resolved.   Discussed importance of outpt follow up and return precautions. Clinically no indication for further emergent ED workup or hospitalization at this time. Stable for dc, outpt f/u.

## 2025-01-30 NOTE — ED PROVIDER NOTE - OBJECTIVE STATEMENT
44M PMH HIV (unknown last CD4 or VL, non-adherent to HAART) p/w epistaxis. Has 1-2w of mild cough/rhinorrhea/congestion. Today he leaned over while brushing his teeth and developed sudden onset L nose bleed. Held pressure, lasted a few minutes, now resolved. No other systemic symptoms.   Denies HA, rashes, abd pain, fevers, chills, sore throat, back pain, lightheaded, SOB/CP, NVD, urinary complaints, black/bloody stool, bleeding from elsewhere. Not on AC.

## 2025-02-06 PROBLEM — Z00.00 ENCOUNTER FOR PREVENTIVE HEALTH EXAMINATION: Status: ACTIVE | Noted: 2025-02-06

## 2025-08-27 ENCOUNTER — NON-APPOINTMENT (OUTPATIENT)
Age: 45
End: 2025-08-27

## 2025-08-28 ENCOUNTER — APPOINTMENT (OUTPATIENT)
Dept: UROLOGY | Facility: CLINIC | Age: 45
End: 2025-08-28

## 2025-09-15 ENCOUNTER — NON-APPOINTMENT (OUTPATIENT)
Age: 45
End: 2025-09-15

## 2025-09-16 ENCOUNTER — APPOINTMENT (OUTPATIENT)
Dept: UROLOGY | Facility: CLINIC | Age: 45
End: 2025-09-16